# Patient Record
Sex: MALE | Race: OTHER | Employment: FULL TIME | URBAN - METROPOLITAN AREA
[De-identification: names, ages, dates, MRNs, and addresses within clinical notes are randomized per-mention and may not be internally consistent; named-entity substitution may affect disease eponyms.]

---

## 2023-08-21 ENCOUNTER — HOSPITAL ENCOUNTER (EMERGENCY)
Age: 63
Discharge: HOME | End: 2023-08-21
Attending: STUDENT IN AN ORGANIZED HEALTH CARE EDUCATION/TRAINING PROGRAM
Payer: COMMERCIAL

## 2023-08-21 ENCOUNTER — APPOINTMENT (OUTPATIENT)
Dept: CT IMAGING | Age: 63
End: 2023-08-21
Payer: COMMERCIAL

## 2023-08-21 ENCOUNTER — APPOINTMENT (OUTPATIENT)
Dept: GENERAL RADIOLOGY | Age: 63
End: 2023-08-21
Payer: COMMERCIAL

## 2023-08-21 VITALS
HEART RATE: 68 BPM | RESPIRATION RATE: 18 BRPM | BODY MASS INDEX: 38.13 KG/M2 | OXYGEN SATURATION: 97 % | SYSTOLIC BLOOD PRESSURE: 171 MMHG | WEIGHT: 251.6 LBS | HEIGHT: 68 IN | TEMPERATURE: 98.2 F | DIASTOLIC BLOOD PRESSURE: 100 MMHG

## 2023-08-21 DIAGNOSIS — R14.0 ABDOMINAL DISTENSION: Primary | ICD-10-CM

## 2023-08-21 LAB
ANION GAP SERPL CALCULATED.3IONS-SCNC: 13 MMOL/L (ref 3–16)
BASOPHILS # BLD: 0.1 K/UL (ref 0–0.2)
BASOPHILS NFR BLD: 1.1 %
BUN SERPL-MCNC: 16 MG/DL (ref 7–20)
CALCIUM SERPL-MCNC: 9 MG/DL (ref 8.3–10.6)
CHLORIDE SERPL-SCNC: 102 MMOL/L (ref 99–110)
CO2 SERPL-SCNC: 22 MMOL/L (ref 21–32)
CREAT SERPL-MCNC: 0.7 MG/DL (ref 0.8–1.3)
DEPRECATED RDW RBC AUTO: 16 % (ref 12.4–15.4)
EOSINOPHIL # BLD: 0.1 K/UL (ref 0–0.6)
EOSINOPHIL NFR BLD: 1.5 %
GFR SERPLBLD CREATININE-BSD FMLA CKD-EPI: >60 ML/MIN/{1.73_M2}
GLUCOSE SERPL-MCNC: 220 MG/DL (ref 70–99)
HCT VFR BLD AUTO: 40.4 % (ref 40.5–52.5)
HGB BLD-MCNC: 13 G/DL (ref 13.5–17.5)
LACTATE BLDV-SCNC: 1.7 MMOL/L (ref 0.4–1.9)
LYMPHOCYTES # BLD: 1.5 K/UL (ref 1–5.1)
LYMPHOCYTES NFR BLD: 22.7 %
MCH RBC QN AUTO: 21.1 PG (ref 26–34)
MCHC RBC AUTO-ENTMCNC: 32.3 G/DL (ref 31–36)
MCV RBC AUTO: 65.4 FL (ref 80–100)
MONOCYTES # BLD: 0.6 K/UL (ref 0–1.3)
MONOCYTES NFR BLD: 8.6 %
NEUTROPHILS # BLD: 4.4 K/UL (ref 1.7–7.7)
NEUTROPHILS NFR BLD: 66.1 %
PLATELET # BLD AUTO: 233 K/UL (ref 135–450)
PMV BLD AUTO: 7.9 FL (ref 5–10.5)
POTASSIUM SERPL-SCNC: 4.2 MMOL/L (ref 3.5–5.1)
RBC # BLD AUTO: 6.18 M/UL (ref 4.2–5.9)
SODIUM SERPL-SCNC: 137 MMOL/L (ref 136–145)
WBC # BLD AUTO: 6.6 K/UL (ref 4–11)

## 2023-08-21 PROCEDURE — 6360000004 HC RX CONTRAST MEDICATION

## 2023-08-21 PROCEDURE — 85025 COMPLETE CBC W/AUTO DIFF WBC: CPT

## 2023-08-21 PROCEDURE — 80048 BASIC METABOLIC PNL TOTAL CA: CPT

## 2023-08-21 PROCEDURE — 74177 CT ABD & PELVIS W/CONTRAST: CPT

## 2023-08-21 PROCEDURE — 36415 COLL VENOUS BLD VENIPUNCTURE: CPT

## 2023-08-21 PROCEDURE — 74022 RADEX COMPL AQT ABD SERIES: CPT

## 2023-08-21 PROCEDURE — 99285 EMERGENCY DEPT VISIT HI MDM: CPT

## 2023-08-21 PROCEDURE — 6370000000 HC RX 637 (ALT 250 FOR IP)

## 2023-08-21 PROCEDURE — 83605 ASSAY OF LACTIC ACID: CPT

## 2023-08-21 PROCEDURE — 2580000003 HC RX 258: Performed by: STUDENT IN AN ORGANIZED HEALTH CARE EDUCATION/TRAINING PROGRAM

## 2023-08-21 RX ORDER — SODIUM CHLORIDE, SODIUM LACTATE, POTASSIUM CHLORIDE, AND CALCIUM CHLORIDE .6; .31; .03; .02 G/100ML; G/100ML; G/100ML; G/100ML
1000 INJECTION, SOLUTION INTRAVENOUS ONCE
Status: COMPLETED | OUTPATIENT
Start: 2023-08-21 | End: 2023-08-21

## 2023-08-21 RX ORDER — MAGNESIUM HYDROXIDE/ALUMINUM HYDROXICE/SIMETHICONE 120; 1200; 1200 MG/30ML; MG/30ML; MG/30ML
30 SUSPENSION ORAL ONCE
Status: COMPLETED | OUTPATIENT
Start: 2023-08-21 | End: 2023-08-21

## 2023-08-21 RX ADMIN — IOPAMIDOL 75 ML: 755 INJECTION, SOLUTION INTRAVENOUS at 13:19

## 2023-08-21 RX ADMIN — ALUMINUM HYDROXIDE, MAGNESIUM HYDROXIDE, AND SIMETHICONE 30 ML: 200; 200; 20 SUSPENSION ORAL at 13:39

## 2023-08-21 RX ADMIN — SODIUM CHLORIDE, POTASSIUM CHLORIDE, SODIUM LACTATE AND CALCIUM CHLORIDE 1000 ML: 600; 310; 30; 20 INJECTION, SOLUTION INTRAVENOUS at 13:02

## 2023-08-21 ASSESSMENT — ENCOUNTER SYMPTOMS
NAUSEA: 0
VOMITING: 0
SHORTNESS OF BREATH: 0
ABDOMINAL DISTENTION: 1
EYES NEGATIVE: 1
ABDOMINAL PAIN: 1

## 2023-08-21 ASSESSMENT — PAIN DESCRIPTION - FREQUENCY: FREQUENCY: CONTINUOUS

## 2023-08-21 ASSESSMENT — PAIN - FUNCTIONAL ASSESSMENT: PAIN_FUNCTIONAL_ASSESSMENT: 0-10

## 2023-08-21 ASSESSMENT — PAIN DESCRIPTION - ORIENTATION: ORIENTATION: RIGHT;MID

## 2023-08-21 ASSESSMENT — PAIN DESCRIPTION - LOCATION: LOCATION: ABDOMEN

## 2023-08-21 ASSESSMENT — PAIN DESCRIPTION - PAIN TYPE: TYPE: ACUTE PAIN

## 2023-08-21 NOTE — ED NOTES
Pt was seen this morning for a colonoscopy, developed pressure and pain in the abdomen. Unable to pass gas in recovery and was referred here by performing physician.       Mika Henriquez  08/21/23 1134

## 2023-08-21 NOTE — DISCHARGE INSTRUCTIONS
You were seen and evaluated in the emergency department today for abdominal distention and pain your colonoscopy. Your evaluation, including blood work, abdominal x-ray, CT abdomen pelvis, showed that there was no perforation of your bowels following your procedure. Your abdominal distention is likely due to the gas that was used during the procedure. You were given simethicone, which is an anti-gas medication, which improved your symptoms. When you return home, please continue to take simethicone to help relieve the gas, which you can purchase over-the-counter. Please follow-up with your GI doctor in 3-5 days if your symptoms do not improve. Return to the emergency department or seek immediate care if you experience:  Worsening abdominal pain or distension that is severe despite using anti-gas medications  Inability to pass gas or stool  Nausea/vomiting or inability to keep food/fluids down  Fever 100.4F or greater that is not controlled with medications  Or any other symptoms that you feel need further evaluation    Thank you for choosing us for your care. No future appointments.

## 2023-08-21 NOTE — ED PROVIDER NOTES
ED Attending Attestation Note     Date of evaluation: 8/21/2023    This patient was seen by the resident. I have seen and examined the patient, agree with the workup, evaluation, management and diagnosis. The care plan has been discussed. My assessment reveals a 59-year-old man who was having a routine colonoscopy in [facility] at 8 AM and was sent in by his gastroenterologist with concerns of a possible iatrogenic perforation after he failed to pass gas after the procedure. On my exam, patient is mildly uncomfortable, prefers to stand, and has a distended and tympanic abdomen. He has no rebound or guarding but does have mild to moderate tenderness in the right mid and right lower abdomen. Hypoactive bowel sounds on auscultation. He has normal pulses in all 4 extremities and his breathing is nonlabored.      Ceci Giles MD  08/21/23 5916

## 2024-04-18 ENCOUNTER — APPOINTMENT (OUTPATIENT)
Age: 64
End: 2024-04-18
Payer: COMMERCIAL

## 2024-04-18 ENCOUNTER — HOSPITAL ENCOUNTER (INPATIENT)
Age: 64
LOS: 1 days | Discharge: HOME | End: 2024-04-19
Attending: EMERGENCY MEDICINE | Admitting: HOSPITALIST
Payer: COMMERCIAL

## 2024-04-18 DIAGNOSIS — R41.82 ALTERED MENTAL STATUS, UNSPECIFIED ALTERED MENTAL STATUS TYPE: ICD-10-CM

## 2024-04-18 DIAGNOSIS — G45.9 TIA (TRANSIENT ISCHEMIC ATTACK): Primary | ICD-10-CM

## 2024-04-18 PROBLEM — R41.0 CONFUSION: Status: ACTIVE | Noted: 2024-04-18

## 2024-04-18 LAB
ALBUMIN SERPL-MCNC: 4.3 G/DL (ref 3.4–5)
ALBUMIN/GLOB SERPL: 1.6 {RATIO}
ALP SERPL-CCNC: 44 U/L (ref 40–129)
ALT SERPL-CCNC: 22 U/L (ref 10–40)
AMPHET UR QL SCN: NEGATIVE
ANION GAP SERPL CALCULATED.3IONS-SCNC: 11 MMOL/L (ref 3–16)
AST SERPL-CCNC: 16 U/L (ref 15–37)
BARBITURATES UR QL SCN: NEGATIVE
BASOPHILS # BLD: 0.03 K/UL (ref 0–0.2)
BASOPHILS NFR BLD: 0 %
BENZODIAZ UR QL: NEGATIVE
BILIRUB SERPL-MCNC: 0.4 MG/DL (ref 0–1)
BILIRUB UR QL STRIP: NEGATIVE
BUN SERPL-MCNC: 26 MG/DL (ref 7–20)
CALCIUM SERPL-MCNC: 10 MG/DL (ref 8.3–10.6)
CANNABINOIDS UR QL SCN: NEGATIVE
CHLORIDE SERPL-SCNC: 101 MMOL/L (ref 99–110)
CLARITY UR: CLEAR
CO2 SERPL-SCNC: 27 MMOL/L (ref 21–32)
COCAINE UR QL SCN: NEGATIVE
COLOR UR: YELLOW
COMMENT: NORMAL
CREAT SERPL-MCNC: 0.7 MG/DL (ref 0.8–1.3)
EKG ATRIAL RATE: 61 BPM
EKG DIAGNOSIS: NORMAL
EKG P AXIS: 51 DEGREES
EKG P-R INTERVAL: 158 MS
EKG Q-T INTERVAL: 430 MS
EKG QRS DURATION: 92 MS
EKG QTC CALCULATION (BAZETT): 432 MS
EKG R AXIS: 1 DEGREES
EKG T AXIS: 22 DEGREES
EKG VENTRICULAR RATE: 61 BPM
EOSINOPHIL # BLD: 0.17 K/UL (ref 0–0.6)
EOSINOPHILS RELATIVE PERCENT: 2 %
ERYTHROCYTE [DISTWIDTH] IN BLOOD BY AUTOMATED COUNT: 17.3 % (ref 12.4–15.4)
FENTANYL UR QL: NEGATIVE
GFR SERPL CREATININE-BSD FRML MDRD: >90 ML/MIN/1.73M2
GLUCOSE BLD-MCNC: 127 MG/DL (ref 70–99)
GLUCOSE SERPL-MCNC: 129 MG/DL (ref 70–99)
GLUCOSE UR STRIP-MCNC: NEGATIVE MG/DL
HCT VFR BLD AUTO: 38.8 % (ref 40.5–52.5)
HGB BLD-MCNC: 12.5 G/DL (ref 13.5–17.5)
HGB UR QL STRIP.AUTO: NEGATIVE
IMM GRANULOCYTES # BLD AUTO: 0.02 K/UL (ref 0–0.5)
IMM GRANULOCYTES NFR BLD: 0 %
INR PPP: 0.9 (ref 0.9–1.2)
KETONES UR STRIP-MCNC: NEGATIVE MG/DL
LEUKOCYTE ESTERASE UR QL STRIP: NEGATIVE
LYMPHOCYTES NFR BLD: 1.88 K/UL (ref 1–5.1)
LYMPHOCYTES RELATIVE PERCENT: 25 %
MCH RBC QN AUTO: 20.5 PG (ref 26–34)
MCHC RBC AUTO-ENTMCNC: 32.2 G/DL (ref 31–36)
MCV RBC AUTO: 63.7 FL (ref 80–100)
METHADONE UR QL: NEGATIVE
MONOCYTES NFR BLD: 0.83 K/UL (ref 0–1.3)
MONOCYTES NFR BLD: 11 %
NEUTROPHILS NFR BLD: 61 %
NEUTS SEG NFR BLD: 4.53 K/UL (ref 1.7–7.7)
NITRITE UR QL STRIP: NEGATIVE
OPIATES UR QL SCN: NEGATIVE
OXYCODONE UR QL SCN: NEGATIVE
PARTIAL THROMBOPLASTIN TIME: 32.3 SEC (ref 22.1–36.4)
PCP UR QL SCN: NEGATIVE
PH UR STRIP: 5.5 [PH]
PH UR STRIP: 5.5 [PH] (ref 5–8)
PLATELET # BLD AUTO: 246 K/UL (ref 135–450)
PMV BLD AUTO: 9.3 FL
POTASSIUM SERPL-SCNC: 4 MMOL/L (ref 3.5–5.1)
PROT SERPL-MCNC: 7 G/DL (ref 6.4–8.2)
PROT UR STRIP-MCNC: NEGATIVE MG/DL
PROTHROMBIN TIME: 12.8 SEC (ref 11.9–14.9)
RBC # BLD AUTO: 6.09 M/UL (ref 4.2–5.9)
RBC # BLD: ABNORMAL 10*6/UL
SODIUM SERPL-SCNC: 138 MMOL/L (ref 136–145)
SP GR UR STRIP: 1.01 (ref 1–1.03)
TEST INFORMATION: NORMAL
TROPONIN I SERPL HS-MCNC: 20 NG/L (ref 0–22)
UROBILINOGEN UR STRIP-ACNC: 0.2 EU/DL (ref 0–1)
WBC OTHER # BLD: 7.5 K/UL (ref 4–11)

## 2024-04-18 PROCEDURE — 6360000004 HC RX CONTRAST MEDICATION: Performed by: EMERGENCY MEDICINE

## 2024-04-18 PROCEDURE — 70498 CT ANGIOGRAPHY NECK: CPT

## 2024-04-18 PROCEDURE — 70551 MRI BRAIN STEM W/O DYE: CPT

## 2024-04-18 PROCEDURE — 84484 ASSAY OF TROPONIN QUANT: CPT

## 2024-04-18 PROCEDURE — 81003 URINALYSIS AUTO W/O SCOPE: CPT

## 2024-04-18 PROCEDURE — 85025 COMPLETE CBC W/AUTO DIFF WBC: CPT

## 2024-04-18 PROCEDURE — 99285 EMERGENCY DEPT VISIT HI MDM: CPT

## 2024-04-18 PROCEDURE — 1200000000 HC SEMI PRIVATE

## 2024-04-18 PROCEDURE — 93005 ELECTROCARDIOGRAM TRACING: CPT | Performed by: EMERGENCY MEDICINE

## 2024-04-18 PROCEDURE — 82962 GLUCOSE BLOOD TEST: CPT

## 2024-04-18 PROCEDURE — 80307 DRUG TEST PRSMV CHEM ANLYZR: CPT

## 2024-04-18 PROCEDURE — 6360000002 HC RX W HCPCS: Performed by: HOSPITALIST

## 2024-04-18 PROCEDURE — 80053 COMPREHEN METABOLIC PANEL: CPT

## 2024-04-18 PROCEDURE — 85730 THROMBOPLASTIN TIME PARTIAL: CPT

## 2024-04-18 PROCEDURE — 70450 CT HEAD/BRAIN W/O DYE: CPT

## 2024-04-18 PROCEDURE — 71045 X-RAY EXAM CHEST 1 VIEW: CPT

## 2024-04-18 PROCEDURE — 85610 PROTHROMBIN TIME: CPT

## 2024-04-18 PROCEDURE — 2580000003 HC RX 258: Performed by: HOSPITALIST

## 2024-04-18 PROCEDURE — 6370000000 HC RX 637 (ALT 250 FOR IP): Performed by: HOSPITALIST

## 2024-04-18 RX ORDER — SODIUM CHLORIDE 9 MG/ML
50 INJECTION, SOLUTION INTRAVENOUS ONCE
Status: CANCELLED | OUTPATIENT
Start: 2024-04-18 | End: 2024-04-18

## 2024-04-18 RX ORDER — ONDANSETRON 4 MG/1
4 TABLET, ORALLY DISINTEGRATING ORAL EVERY 8 HOURS PRN
Status: DISCONTINUED | OUTPATIENT
Start: 2024-04-18 | End: 2024-04-19 | Stop reason: HOSPADM

## 2024-04-18 RX ORDER — LOSARTAN POTASSIUM 25 MG/1
25 TABLET ORAL DAILY
COMMUNITY
Start: 2024-02-15

## 2024-04-18 RX ORDER — SODIUM CHLORIDE 9 MG/ML
INJECTION, SOLUTION INTRAVENOUS PRN
Status: DISCONTINUED | OUTPATIENT
Start: 2024-04-18 | End: 2024-04-19 | Stop reason: HOSPADM

## 2024-04-18 RX ORDER — METOPROLOL SUCCINATE 200 MG/1
200 TABLET, EXTENDED RELEASE ORAL DAILY
COMMUNITY
Start: 2022-08-15

## 2024-04-18 RX ORDER — SODIUM CHLORIDE 0.9 % (FLUSH) 0.9 %
5-40 SYRINGE (ML) INJECTION PRN
Status: DISCONTINUED | OUTPATIENT
Start: 2024-04-18 | End: 2024-04-19 | Stop reason: HOSPADM

## 2024-04-18 RX ORDER — PANTOPRAZOLE SODIUM 40 MG/1
40 TABLET, DELAYED RELEASE ORAL DAILY
COMMUNITY
Start: 2024-02-11

## 2024-04-18 RX ORDER — ATORVASTATIN CALCIUM 80 MG/1
80 TABLET, FILM COATED ORAL NIGHTLY
Status: DISCONTINUED | OUTPATIENT
Start: 2024-04-18 | End: 2024-04-19 | Stop reason: HOSPADM

## 2024-04-18 RX ORDER — SODIUM CHLORIDE 0.9 % (FLUSH) 0.9 %
5-40 SYRINGE (ML) INJECTION EVERY 12 HOURS SCHEDULED
Status: DISCONTINUED | OUTPATIENT
Start: 2024-04-18 | End: 2024-04-19 | Stop reason: HOSPADM

## 2024-04-18 RX ORDER — ROSUVASTATIN CALCIUM 10 MG/1
10 TABLET, COATED ORAL DAILY
COMMUNITY
Start: 2024-03-16

## 2024-04-18 RX ORDER — POLYETHYLENE GLYCOL 3350 17 G/17G
17 POWDER, FOR SOLUTION ORAL DAILY PRN
Status: DISCONTINUED | OUTPATIENT
Start: 2024-04-18 | End: 2024-04-19 | Stop reason: HOSPADM

## 2024-04-18 RX ORDER — ASPIRIN 81 MG/1
81 TABLET, CHEWABLE ORAL DAILY
Status: DISCONTINUED | OUTPATIENT
Start: 2024-04-18 | End: 2024-04-19 | Stop reason: HOSPADM

## 2024-04-18 RX ORDER — ONDANSETRON 2 MG/ML
4 INJECTION INTRAMUSCULAR; INTRAVENOUS EVERY 6 HOURS PRN
Status: DISCONTINUED | OUTPATIENT
Start: 2024-04-18 | End: 2024-04-19 | Stop reason: HOSPADM

## 2024-04-18 RX ORDER — ENOXAPARIN SODIUM 100 MG/ML
30 INJECTION SUBCUTANEOUS 2 TIMES DAILY
Status: DISCONTINUED | OUTPATIENT
Start: 2024-04-18 | End: 2024-04-19 | Stop reason: HOSPADM

## 2024-04-18 RX ORDER — ASPIRIN 300 MG/1
300 SUPPOSITORY RECTAL DAILY
Status: DISCONTINUED | OUTPATIENT
Start: 2024-04-18 | End: 2024-04-19 | Stop reason: HOSPADM

## 2024-04-18 RX ADMIN — Medication 10 ML: at 21:12

## 2024-04-18 RX ADMIN — ASPIRIN 81 MG 81 MG: 81 TABLET ORAL at 18:59

## 2024-04-18 RX ADMIN — ATORVASTATIN CALCIUM 80 MG: 80 TABLET, FILM COATED ORAL at 21:12

## 2024-04-18 RX ADMIN — ENOXAPARIN SODIUM 30 MG: 100 INJECTION SUBCUTANEOUS at 21:12

## 2024-04-18 RX ADMIN — IOPAMIDOL 75 ML: 755 INJECTION, SOLUTION INTRAVENOUS at 14:59

## 2024-04-18 ASSESSMENT — LIFESTYLE VARIABLES
HOW MANY STANDARD DRINKS CONTAINING ALCOHOL DO YOU HAVE ON A TYPICAL DAY: 1 OR 2
HOW OFTEN DO YOU HAVE A DRINK CONTAINING ALCOHOL: MONTHLY OR LESS

## 2024-04-18 ASSESSMENT — PAIN - FUNCTIONAL ASSESSMENT: PAIN_FUNCTIONAL_ASSESSMENT: NONE - DENIES PAIN

## 2024-04-18 NOTE — ACP (ADVANCE CARE PLANNING)
Advanced Care Planning Note:     Purpose of Encounter: Advanced care planning in light of hospitalization    Parties In Attendance: Patient     Decisional Capacity: Yes    Subjective: Patient understands that this conversation for any life threatening event and his future wishes to address long term care goal if situations arise that prevent the ability to personally give input    Objective: Patient is admitted to the hospital with word finding dificulty      Goals of Care Determination: Patient would like to pursue  full care with all aggressive measures.    Code Status: Full code    Time spent on Advanced care Plannin minutes    Advanced Care Planning Documents: documented patient's wishes, would like his wife - Ling Martinez to make medical decisions if unable to make decisions    Edd Padilla MD  2024 5:39 PM

## 2024-04-18 NOTE — H&P
Height:           Body mass index is 37.27 kg/m².  No intake or output data in the 24 hours ending 04/18/24 1730   Physical Exam:    General-Alert, awake, Oriented x 3, No apparent distress.   HEENT- conjunctiva nonicterus, Mouth - mucosa moist, Neck - no JVD  Heart- S1, S2, RRR, No M/G  Lungs- BS clear bilaterally, No rales or wheeze.  Abd - BS++, Soft. NT, Non distended. No rebound tenderness, guarding or rigidity.  Extr - No pedal edema, good color of foot, peripheral pulses intact    CNS - Face symmetrical, speech clear, EOMI, motor 5/5 in all 4 extremities, sensations intact bilaterally.  Psych: No psychomotor agitation or depression     Labs      Recent Labs     04/18/24  1401   WBC 7.5   HGB 12.5*   HCT 38.8*                                                                     Recent Labs     04/18/24  1401      K 4.0      CO2 27   BUN 26*   CREATININE 0.7*   GLUCOSE 129*     Recent Labs     04/18/24  1401   AST 16   ALT 22   BILITOT 0.4   ALKPHOS 44     No results for input(s): \"TROPONINI\" in the last 72 hours.  No results for input(s): \"BNP\" in the last 72 hours.  No results found for: \"PHART\", \"YNF3NKU\", \"PO2ART\"  Recent Labs     04/18/24  1401   INR 0.9     Recent Labs     04/18/24  1452   COLORU Yellow   PHUR 5.5   SPECGRAV 1.010   LEUKOCYTESUR NEGATIVE   UROBILINOGEN 0.2   BILIRUBINUR NEGATIVE   GLUCOSEU NEGATIVE      Imaging/Diagnostics:   CT HEAD WO CONTRAST    Result Date: 4/18/2024  CT HEAD WO CONTRAST, CTA HEAD NECK W CONTRAST Indication: Stroke Symptoms Comparison: None Technique:  Non-contrast axial CT of the brain. Head and neck CTA was performed with IV contrast. Multiplanar MIP reconstructions were created. 75 mL of Isovue-370 IV. Quantitative stenosis measurements were calculated on the basis of diameter stenosis, per NASCET criteria. Findings:   CT HEAD: No acute intracranial hemorrhage, extra-axial fluid collection, mass effect, or shift. No hydrocephalus. Parenchymal  hemorrhage, extra-axial fluid collection, mass effect, or shift. No hydrocephalus. Parenchymal gray-white differentiation is preserved. Visualized paranasal sinuses and mastoid air cells are clear. Calvarium is intact. CTA NECK: There is a 3 vessel aortic arch. The origins of the great vessels are patent without hemodynamically significant stenosis. Common carotid arteries are patent and normal in caliber. Right Internal Carotid Artery: The carotid bulb is unremarkable. No significant stenosis or occlusion. No intimal flap. Left Internal Carotid Artery: The carotid bulb is unremarkable. No significant stenosis or occlusion. No intimal flap. The vertebral arteries arise from the subclavian arteries. The vertebral arteries are co-dominant. There is no hemodynamically significant stenosis or occlusion of the vertebral arteries. Lung apices are clear. There are mild multilevel degenerative changes of the cervical spine. CTA HEAD: The high cervical, petrous, cavernous, and supraclinoid internal carotid arteries have a normal course and caliber. An anterior communicating artery is visualized. The proximal GILDARDO and MCA branches are patent. There is a right posterior communicating artery and a left posterior communicating artery. The basilar artery has a normal caliber. Proximal PCA branches are patent. SCA origins are visualized. The origins of the PICAs are visualized. There is no intracranial high-grade stenosis. No aneurysm or arteriovenous malformation.     1.  No acute intracranial findings by non-contrast CT head. 2.  No acute vascular findings.    XR CHEST PORTABLE    Result Date: 4/18/2024  EXAM: XR CHEST PORTABLE INDICATION: stroke symptoms COMPARISON: 8/21/2023 FINDINGS: Medical Devices: None. Lungs: The lungs are clear. Pleura: No pneumothorax or pleural effusion. Heart and Mediastinum: The cardiomediastinal silhouette is within normal limits. Bones: No acute suspicious abnormality.     1.  No acute

## 2024-04-18 NOTE — ED NOTES
Pt ambulated to restroom without difficulty, and with a smooth and steady gait. Pt denied dizziness upon standing or during ambulation.

## 2024-04-18 NOTE — PROGRESS NOTES
4 Eyes Skin Assessment     NAME:  Balta Martinez  YOB: 1960  MEDICAL RECORD NUMBER:  [id number]    The patient is being assessed for  Admission    I agree that at least one RN has performed a thorough Head to Toe Skin Assessment on the patient. ALL assessment sites listed below have been assessed.      Areas assessed by both nurses:    Head, Face, Ears, Shoulders, Back, Chest, Arms, Elbows, Hands, Sacrum. Buttock, Coccyx, Ischium, Legs. Feet and Heels, and Under Medical Devices         Does the Patient have a Wound? No noted wound(s)       Marcos Prevention initiated by RN: No  Wound Care Orders initiated by RN: No    Pressure Injury (Stage 3,4, Unstageable, DTI, NWPT, and Complex wounds) if present, place Wound referral order by RN under : No    New Ostomies, if present place, Ostomy referral order under : No     Nurse 1 eSignature: Electronically signed by Leny Kim RN on 4/18/24 at 7:45 PM EDT    **SHARE this note so that the co-signing nurse can place an eSignature**    Nurse 2 eSignature: Electronically signed by ISAIAH KELLY RN on 4/19/24 at 12:00 PM EDT

## 2024-04-18 NOTE — ED NOTES
ED TO INPATIENT SBAR HANDOFF    Patient Name: Balta Martinez   :  1960  64 y.o.   MRN:  [id number]  Preferred Name    ED Room #:    Family/Caregiver Present no   Restraints no   Sitter no   Sepsis Risk Score Sepsis Risk Score: 0.63    Situation  Code Status: No Order No additional code details.    Allergies: Codeine, Penicillins, and Prednisone  Weight: Patient Vitals for the past 96 hrs (Last 3 readings):   Weight   24 1341 111.2 kg (245 lb 1.6 oz)     Arrived from: home  Chief Complaint:   Chief Complaint   Patient presents with    Altered Mental Status     Altered mental status. 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 Unable to remember certain things having pressure to left eye     Hospital Problem/Diagnosis:  Active Problems:    * No active hospital problems. *  Resolved Problems:    * No resolved hospital problems. *    Imaging:   CT HEAD WO CONTRAST   Final Result      1.  No acute intracranial findings by non-contrast CT head.   2.  No acute vascular findings.      CTA HEAD NECK W CONTRAST   Final Result      1.  No acute intracranial findings by non-contrast CT head.   2.  No acute vascular findings.      XR CHEST PORTABLE   Final Result   1.  No acute cardiopulmonary findings.        Abnormal labs:   Abnormal Labs Reviewed   CBC WITH AUTO DIFFERENTIAL - Abnormal; Notable for the following components:       Result Value    RBC 6.09 (*)     Hemoglobin 12.5 (*)     Hematocrit 38.8 (*)     MCV 63.7 (*)     MCH 20.5 (*)     RDW 17.3 (*)     All other components within normal limits   COMPREHENSIVE METABOLIC PANEL W/ REFLEX TO MG FOR LOW K - Abnormal; Notable for the following components:    Glucose 129 (*)     BUN 26 (*)     Creatinine 0.7 (*)     All other components within normal limits   POCT GLUCOSE - Abnormal; Notable for the following components:    POC Glucose 127 (*)     All other components within normal limits     Critical

## 2024-04-18 NOTE — ED NOTES
Attempted to send small amount urine pt provided and per lab sample was insufficient to run.  Pt aware that another sample would need to be provided.

## 2024-04-18 NOTE — ED PROVIDER NOTES
Atrial Rate 61 BPM    P-R Interval 158 ms    QRS Duration 92 ms    Q-T Interval 430 ms    QTc Calculation (Bazett) 432 ms    P Axis 51 degrees    R Axis 1 degrees    T Axis 22 degrees    Diagnosis       Normal sinus rhythmCannot rule out Anterior infarct , age undeterminedAbnormal ECGNo previous ECGs available       RADIOLOGY  Any applicable radiology studies including x-ray, CT, MRI, and/or ultrasound, were reviewed independently by me in addition to the radiologist.  I reviewed all radiology images and reports as well from this evaluation.  CT HEAD WO CONTRAST   Final Result      1.  No acute intracranial findings by non-contrast CT head.   2.  No acute vascular findings.      CTA HEAD NECK W CONTRAST   Final Result      1.  No acute intracranial findings by non-contrast CT head.   2.  No acute vascular findings.      XR CHEST PORTABLE   Final Result   1.  No acute cardiopulmonary findings.            ED COURSE/[facility]  Old records reviewed. Labs and imaging reviewed.  Patient seen and evaluated by myself.  Patient presents for evaluation of difficulty word finding and slight confusion as well as left-sided heaviness and slight pressure behind the eye.  Patient's physical exam is unremarkable and he has a stroke scale of 0.  States he feels only minimal symptoms at this time which did end up fully resolving while here.  Patient's lab workup is reassuring.  CT and CTA of head and neck are unremarkable.  He does however have risk factors including hypertension and diabetes and has a family history of significant stroke in his father at age 65.  I do feel this could represent a TIA and feel that hospitalization for MRI and further stroke workup would be most appropriate.    Consults:  Hospitalist service consulted for admission    History obtained from:   Patient    Pertinent social determinants of health:   None applicable    Review of other records:  None    Medications given:  Medications   iopamidol (ISOVUE-370) 76 %  injection 75 mL (75 mLs IntraVENous Given 4/18/24 4824)        Hospitalization considered?:  Yes    Additional testing considered?:  MRI brain, deferred to inpatient    Sepsis present?:  No    Discharge Medications:  New Prescriptions    No medications on file       PROCEDURES  None    CLINICAL IMPRESSION  1. TIA (transient ischemic attack)    2. Altered mental status, unspecified altered mental status type        DISPOSITION  Balta Martinez was hospitalized in stable condition.    DISPOSITION TIME  1620    CRITICAL CARE TIME  None        I have personally seen and examined this patient. I have fully participated in the care of this patient and I have reviewed and agree with all pertinent clinical information including history, physical exam, and plan. I have also reviewed and agree with the medications, allergies and past medical history section for this patient.    Electronically signed by: Rom Brady Jr., , ELAINE, [facility], 4/18/2024  4:28 PM       Rom Brady DO  04/18/24 6172

## 2024-04-18 NOTE — ED NOTES
Pt up to bathroom at this time and unable to provide urine sample. Pt returned to room without incident. No acute distress needed. Pt placed self in bed. Call light in reach. Side rails up x2. Denies needs at this time

## 2024-04-19 ENCOUNTER — APPOINTMENT (OUTPATIENT)
Age: 64
End: 2024-04-19
Payer: COMMERCIAL

## 2024-04-19 VITALS
OXYGEN SATURATION: 98 % | SYSTOLIC BLOOD PRESSURE: 151 MMHG | DIASTOLIC BLOOD PRESSURE: 88 MMHG | BODY MASS INDEX: 37.15 KG/M2 | TEMPERATURE: 98.2 F | HEIGHT: 68 IN | RESPIRATION RATE: 18 BRPM | WEIGHT: 245.1 LBS | HEART RATE: 81 BPM

## 2024-04-19 LAB
CHOLEST SERPL-MCNC: 127 MG/DL (ref 0–199)
EKG ATRIAL RATE: 68 BPM
EKG DIAGNOSIS: NORMAL
EKG P AXIS: 36 DEGREES
EKG P-R INTERVAL: 164 MS
EKG Q-T INTERVAL: 410 MS
EKG QRS DURATION: 92 MS
EKG QTC CALCULATION (BAZETT): 435 MS
EKG R AXIS: -1 DEGREES
EKG T AXIS: 31 DEGREES
EKG VENTRICULAR RATE: 68 BPM
ERYTHROCYTE [DISTWIDTH] IN BLOOD BY AUTOMATED COUNT: 17.2 % (ref 12.4–15.4)
EST. AVERAGE GLUCOSE BLD GHB EST-MCNC: 137 MG/DL
GLUCOSE BLD-MCNC: 143 MG/DL (ref 70–99)
HBA1C MFR BLD: 6.4 %
HCT VFR BLD AUTO: 37.9 % (ref 40.5–52.5)
HDLC SERPL-MCNC: 24 MG/DL (ref 40–60)
HGB BLD-MCNC: 12.4 G/DL (ref 13.5–17.5)
LDLC SERPL CALC-MCNC: 57 MG/DL (ref 0–99)
MCH RBC QN AUTO: 20.7 PG (ref 26–34)
MCHC RBC AUTO-ENTMCNC: 32.7 G/DL (ref 31–36)
MCV RBC AUTO: 63.4 FL (ref 80–100)
PLATELET # BLD AUTO: 214 K/UL (ref 135–450)
PMV BLD AUTO: 8.9 FL
RBC # BLD AUTO: 5.98 M/UL (ref 4.2–5.9)
TRIGL SERPL-MCNC: 232 MG/DL (ref 0–149)
TROPONIN I SERPL HS-MCNC: 12 NG/L (ref 0–22)
TROPONIN I SERPL HS-MCNC: 14 NG/L (ref 0–22)
VLDLC SERPL CALC-MCNC: 46 MG/DL
WBC OTHER # BLD: 7.9 K/UL (ref 4–11)

## 2024-04-19 PROCEDURE — 6370000000 HC RX 637 (ALT 250 FOR IP): Performed by: HOSPITALIST

## 2024-04-19 PROCEDURE — 2580000003 HC RX 258: Performed by: HOSPITALIST

## 2024-04-19 PROCEDURE — 36415 COLL VENOUS BLD VENIPUNCTURE: CPT

## 2024-04-19 PROCEDURE — 71045 X-RAY EXAM CHEST 1 VIEW: CPT

## 2024-04-19 PROCEDURE — 83036 HEMOGLOBIN GLYCOSYLATED A1C: CPT

## 2024-04-19 PROCEDURE — 6360000004 HC RX CONTRAST MEDICATION: Performed by: STUDENT IN AN ORGANIZED HEALTH CARE EDUCATION/TRAINING PROGRAM

## 2024-04-19 PROCEDURE — 80061 LIPID PANEL: CPT

## 2024-04-19 PROCEDURE — C8929 TTE W OR WO FOL WCON,DOPPLER: HCPCS

## 2024-04-19 PROCEDURE — 85027 COMPLETE CBC AUTOMATED: CPT

## 2024-04-19 PROCEDURE — 84484 ASSAY OF TROPONIN QUANT: CPT

## 2024-04-19 PROCEDURE — 82962 GLUCOSE BLOOD TEST: CPT

## 2024-04-19 RX ORDER — ASPIRIN 81 MG/1
81 TABLET, CHEWABLE ORAL DAILY
Qty: 30 TABLET | Refills: 3 | Status: SHIPPED | OUTPATIENT
Start: 2024-04-20

## 2024-04-19 RX ORDER — SIMETHICONE 80 MG
80 TABLET,CHEWABLE ORAL EVERY 6 HOURS PRN
Status: DISCONTINUED | OUTPATIENT
Start: 2024-04-19 | End: 2024-04-19 | Stop reason: HOSPADM

## 2024-04-19 RX ADMIN — ASPIRIN 81 MG 81 MG: 81 TABLET ORAL at 09:26

## 2024-04-19 RX ADMIN — SIMETHICONE 80 MG: 80 TABLET, CHEWABLE ORAL at 09:26

## 2024-04-19 RX ADMIN — Medication 10 ML: at 09:26

## 2024-04-19 RX ADMIN — PERFLUTREN 1.5 ML: 6.52 INJECTION, SUSPENSION INTRAVENOUS at 08:53

## 2024-04-19 NOTE — CARE COORDINATION
Case Management Assessment  Initial Evaluation    Date/Time of Evaluation: 4/19/2024 2:04 PM  Assessment Completed by: ANNA Bailey    If patient is discharged prior to next notation, then this note serves as note for discharge by case management.    Patient Name: Blata Martinez                   YOB: 1960  Diagnosis: TIA (transient ischemic attack) [G45.9]  Confusion [R41.0]  Altered mental status, unspecified altered mental status type [R41.82]                   Date / Time: 4/18/2024  1:37 PM    Patient Admission Status: Inpatient   Readmission Risk (Low < 19, Mod (19-27), High > 27): Readmission Risk Score: 8.1    Current PCP: No primary care provider on file.  PCP verified by CM? Yes (Dr. Carrillo)    Chart Reviewed: Yes      History Provided by: Patient  Patient Orientation: Alert and Oriented    Patient Cognition: Alert    Hospitalization in the last 30 days (Readmission):  No    If yes, Readmission Assessment in CM Navigator will be completed.    Advance Directives:      Code Status: Full Code   Patient's Primary Decision Maker is: Legal Next of Kin      Discharge Planning:    Patient lives with: Spouse/Significant Other Type of Home: House  Primary Care Giver: Self  Patient Support Systems include: Spouse/Significant Other   Current Financial resources: Other (Comment) (commercial)  Current community resources:    Current services prior to admission: None            Current DME:              Type of Home Care services:  None    ADLS  Prior functional level: Independent in ADLs/IADLs  Current functional level: Independent in ADLs/IADLs    PT AM-PAC:   /24  OT AM-PAC:   /24    Family can provide assistance at DC: Yes  Would you like Case Management to discuss the discharge plan with any other family members/significant others, and if so, who? No  Plans to Return to Present Housing: Yes  Other Identified Issues/Barriers to RETURNING to current housing: Yes  Potential Assistance needed

## 2024-04-19 NOTE — PLAN OF CARE
Problem: Discharge Planning  Goal: Discharge to home or other facility with appropriate resources  4/19/2024 1146 by Leny Kim, RN  Outcome: Progressing  4/18/2024 2308 by Bam Desir, RN  Outcome: Progressing

## 2024-04-19 NOTE — PROGRESS NOTES
Speech Language Pathology    Contacted MARCELLUS Kim to see pt for speech and language evaluation. RN reported that pt is no longer experiencing s/s of AMS: speech has been clear and direct, word finding issues no longer present. Spoke to pt briefly, who also denied current word-finding issues; described it as a transient issue that cleared up but which he felt the need to check up on with the hospital. Pt is stable to DC home at this time. Contacted Dr. Edd Padilla. Contact  if any changes occur.     Imer Antoine M.S. CCC-SLP  Speech-Language Pathologist  [phone]

## 2024-04-19 NOTE — CARE COORDINATION
Discharge Note     Discharge order received.  This patient will discharge home with no needs.     Destination: Home in care of self.    Transportation: Pt to drive self home    All case management needs met.      Comment: Pt to d/c home in care of self.

## 2024-04-19 NOTE — DISCHARGE INSTR - COC
Continuity of Care Form    Patient Name: Balta Palencia   :  1960  MRN:  [id number]    Admit date:  2024  Discharge date:  ***    Code Status Order: Full Code   Advance Directives:     Admitting Physician:  Edd Padilla MD  PCP: No primary care provider on file.    Discharging Nurse: ***  Discharging Hospital Unit/Room#: 3220/3220-01  Discharging Unit Phone Number: ***    Emergency Contact:   Extended Emergency Contact Information  Primary Emergency Contact: RUMA PALENCIA  Mobile Phone: [phone]  Relation: Spouse    Past Surgical History:  History reviewed. No pertinent surgical history.    Immunization History:     There is no immunization history on file for this patient.    Active Problems:  Patient Active Problem List   Diagnosis Code    Confusion R41.0       Isolation/Infection:   Isolation            No Isolation          Patient Infection Status       None to display            Nurse Assessment:  Last Vital Signs: BP (!) 151/88   Pulse 81   Temp 98.2 °F (36.8 °C) (Oral)   Resp 18   Ht 1.727 m (5' 8\")   Wt 111.2 kg (245 lb 1.6 oz)   SpO2 98%   BMI 37.27 kg/m²     Last documented pain score (0-10 scale):    Last Weight:   Wt Readings from Last 1 Encounters:   24 111.2 kg (245 lb 1.6 oz)     Mental Status:  {IP PT MENTAL STATUS:}    IV Access:  { BONITA IV ACCESS:322034582}    Nursing Mobility/ADLs:  Walking   {CHP DME ADLs:342779224}  Transfer  {CHP DME ADLs:790456466}  Bathing  {CHP DME ADLs:639579582}  Dressing  {CHP DME ADLs:562244552}  Toileting  {CHP DME ADLs:033902485}  Feeding  {CHP DME ADLs:578771263}  Med Admin  {P DME ADLs:751928238}  Med Delivery   { BONITA MED Delivery:248283056}    Wound Care Documentation and Therapy:        Elimination:  Continence:   Bowel: {YES / NO:}  Bladder: {YES / NO:}  Urinary Catheter: {Urinary Catheter:397685830}   Colostomy/Ileostomy/Ileal Conduit: {YES / NO:}       Date of Last BM: ***  No intake or output data in the 24

## 2024-04-19 NOTE — DISCHARGE SUMMARY
Discharge Summary    Name:  Balta Martinez /Age/Sex: 1960 (64 y.o. male)   Admit Date: 2024  Discharge Date: 24    MRN & CSN:  [id number] & [id number] Encounter Date and Time 24    Attending:  Edd Padilla MD Discharging Provider: Edd Padilla MD     Hospital Course:   REASON FOR ADMISSION/CHIEF COMPLAINT:   Confusion    PRINCIPAL DIAGNOSIS* AND HOSPITAL PROBLEM LIST:  TIA (transient ischemic attack) [G45.9]  Confusion [R41.0]  Altered mental status, unspecified altered mental status type [R41.82]    CONSULTS DURING THE ADMISSION:  IP CONSULT TO RESPIRATORY CARE    BRIEF HPI & SUMMARY OF HOSPITAL COURSE:   Balta Martinez is a 64 y.o. male with medical history including DM2, HTN, GERD, HLD and thalassemia minor presented to emergency room for evaluation of confusion and altered mental status.  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  His student notified his supervisor and recommended to see a doctor but his PCP is out of town, so came to the hospital for further evaluation.  His NIH score was 0 in ER and his symptoms of word finding difficulty and confusion resolved but patient was reporting some heaviness in the left side of his upper and lower extremities.  CT head and CTA head and neck was unremarkable in the emergency room.  AMS/Confusion with Word finding difficulty and sluggish speech, unclear etiology, possible TIA: NIH score 0 in ER. CT head and CTA H and N unremarkable.  MRI brain with no acute finding.  Echocardiogram with EF 55 to 60%, grade 1 diastolic dysfunction.  Add aspirin, continue statins.  Follow-up with PCP and neurologist as an outpatient.  DM-2: Resume home metformin  HTN: Resume home metoprolol, losartan  GERD: Resume home pepto bismol  HLD: Resume home statins  Microcytic anemia, hx of thalassemia minor  On day  multisequence images of the brain were obtained without intravenous contrast. FINDINGS: No acute infarct, intracranial hemorrhage, mass effect, hydrocephalus, or midline shift. Brain signal intensity is within normal limits. Gradient-echo images demonstrates no abnormality. Ventricles and sulci are normal in size. Basal cisterns are clear. Flow voids are preserved in major intracranial vessels. Paranasal sinuses and mastoid air cells are clear.     No acute infarct, hemorrhage, or mass effect. Electronically signed by Omar Serrano MD    CT HEAD WO CONTRAST    Result Date: 4/18/2024  CT HEAD WO CONTRAST, CTA HEAD NECK W CONTRAST Indication: Stroke Symptoms Comparison: None Technique:  Non-contrast axial CT of the brain. Head and neck CTA was performed with IV contrast. Multiplanar MIP reconstructions were created. 75 mL of Isovue-370 IV. Quantitative stenosis measurements were calculated on the basis of diameter stenosis, per NASCET criteria. Findings:   CT HEAD: No acute intracranial hemorrhage, extra-axial fluid collection, mass effect, or shift. No hydrocephalus. Parenchymal gray-white differentiation is preserved. Visualized paranasal sinuses and mastoid air cells are clear. Calvarium is intact. CTA NECK: There is a 3 vessel aortic arch. The origins of the great vessels are patent without hemodynamically significant stenosis. Common carotid arteries are patent and normal in caliber. Right Internal Carotid Artery: The carotid bulb is unremarkable. No significant stenosis or occlusion. No intimal flap. Left Internal Carotid Artery: The carotid bulb is unremarkable. No significant stenosis or occlusion. No intimal flap. The vertebral arteries arise from the subclavian arteries. The vertebral arteries are co-dominant. There is no hemodynamically significant stenosis or occlusion of the vertebral arteries. Lung apices are clear. There are mild multilevel degenerative changes of the cervical spine. CTA HEAD: The high

## 2024-04-19 NOTE — PROGRESS NOTES
Discharge instructions reviewed with patient. Verbalized understanding. IV dc'd without complications. Tele box returned to nurse's station. Patient to be taken to front entrance via wheelchair.

## 2024-04-19 NOTE — PROGRESS NOTES
Physical Therapy  Sign Off, no Eval - Pt Up Ad Rosita in room and denies IPPT needs    Chart review completed.  Attempted to perform eval of pt at 920 on 4/19/24.  Per discussion with RN prior to attempt, pt has been up ad rosita in room since admission.  Per discussion with pt, pt confirms that he has been moving independently in room with no issues other than feeling tingling/numbness in his feet.  Pt reports that this tingling and numbness does occur occasionally at home.  Pt reports no concerns with his functional mobility for d/c, pt has all needed equipment at home, pt denies need for IPPT evaluation/treatment at this time.  Will sign off, RN in room and aware, please re-order should pt's status change.    Annmarie Stringer, PT

## 2024-04-22 LAB
EKG ATRIAL RATE: 74 BPM
EKG DIAGNOSIS: NORMAL
EKG P AXIS: 34 DEGREES
EKG P-R INTERVAL: 154 MS
EKG Q-T INTERVAL: 404 MS
EKG QRS DURATION: 90 MS
EKG QTC CALCULATION (BAZETT): 448 MS
EKG R AXIS: 1 DEGREES
EKG T AXIS: 37 DEGREES
EKG VENTRICULAR RATE: 74 BPM

## 2024-04-25 LAB
EKG ATRIAL RATE: 68 BPM
EKG DIAGNOSIS: NORMAL
EKG P AXIS: 36 DEGREES
EKG P-R INTERVAL: 164 MS
EKG Q-T INTERVAL: 410 MS
EKG QRS DURATION: 92 MS
EKG QTC CALCULATION (BAZETT): 435 MS
EKG R AXIS: -1 DEGREES
EKG T AXIS: 31 DEGREES
EKG VENTRICULAR RATE: 68 BPM